# Patient Record
Sex: MALE | Race: WHITE | NOT HISPANIC OR LATINO | URBAN - METROPOLITAN AREA
[De-identification: names, ages, dates, MRNs, and addresses within clinical notes are randomized per-mention and may not be internally consistent; named-entity substitution may affect disease eponyms.]

---

## 2019-09-14 ENCOUNTER — INPATIENT HOSPITAL (OUTPATIENT)
Dept: URBAN - METROPOLITAN AREA HOSPITAL 107 | Facility: HOSPITAL | Age: 34
End: 2019-09-14

## 2019-09-14 DIAGNOSIS — R19.7 DIARRHEA, UNSPECIFIED: ICD-10-CM

## 2019-09-14 DIAGNOSIS — R10.9 UNSPECIFIED ABDOMINAL PAIN: ICD-10-CM

## 2019-09-14 DIAGNOSIS — E86.0 DEHYDRATION: ICD-10-CM

## 2019-09-14 DIAGNOSIS — R11.0 NAUSEA: ICD-10-CM

## 2019-09-14 DIAGNOSIS — R74.8 ABNORMAL LEVELS OF OTHER SERUM ENZYMES: ICD-10-CM

## 2019-09-14 DIAGNOSIS — R93.2 ABNORMAL FINDINGS ON DIAGNOSTIC IMAGING OF LIVER AND BILIARY: ICD-10-CM

## 2019-09-14 DIAGNOSIS — K76.0 FATTY (CHANGE OF) LIVER, NOT ELSEWHERE CLASSIFIED: ICD-10-CM

## 2019-09-14 PROCEDURE — 99254 IP/OBS CNSLTJ NEW/EST MOD 60: CPT | Performed by: INTERNAL MEDICINE

## 2019-09-15 PROCEDURE — 99232 SBSQ HOSP IP/OBS MODERATE 35: CPT | Performed by: INTERNAL MEDICINE

## 2019-09-16 ENCOUNTER — INPATIENT HOSPITAL (OUTPATIENT)
Dept: URBAN - METROPOLITAN AREA HOSPITAL 107 | Facility: HOSPITAL | Age: 34
End: 2019-09-16

## 2019-09-16 DIAGNOSIS — R19.7 DIARRHEA, UNSPECIFIED: ICD-10-CM

## 2019-09-16 DIAGNOSIS — R10.9 UNSPECIFIED ABDOMINAL PAIN: ICD-10-CM

## 2019-09-16 DIAGNOSIS — K76.0 FATTY (CHANGE OF) LIVER, NOT ELSEWHERE CLASSIFIED: ICD-10-CM

## 2019-09-16 DIAGNOSIS — R11.0 NAUSEA: ICD-10-CM

## 2019-09-16 PROCEDURE — 99231 SBSQ HOSP IP/OBS SF/LOW 25: CPT | Performed by: PHYSICIAN ASSISTANT

## 2020-02-20 ENCOUNTER — TRANSCRIBE ORDERS (OUTPATIENT)
Dept: ADMINISTRATIVE | Facility: HOSPITAL | Age: 35
End: 2020-02-20

## 2020-02-20 DIAGNOSIS — R10.11 ABDOMINAL PAIN, RUQ (RIGHT UPPER QUADRANT): Primary | ICD-10-CM

## 2020-03-11 ENCOUNTER — OFFICE VISIT (OUTPATIENT)
Dept: SURGERY | Facility: CLINIC | Age: 35
End: 2020-03-11

## 2020-03-11 VITALS
BODY MASS INDEX: 38.04 KG/M2 | WEIGHT: 251 LBS | HEART RATE: 80 BPM | HEIGHT: 68 IN | SYSTOLIC BLOOD PRESSURE: 120 MMHG | RESPIRATION RATE: 16 BRPM | DIASTOLIC BLOOD PRESSURE: 80 MMHG

## 2020-03-11 DIAGNOSIS — R11.0 NAUSEA: Primary | ICD-10-CM

## 2020-03-11 DIAGNOSIS — I25.2 H/O ACUTE MYOCARDIAL INFARCTION: Primary | ICD-10-CM

## 2020-03-11 DIAGNOSIS — R19.7 DIARRHEA, UNSPECIFIED TYPE: ICD-10-CM

## 2020-03-11 DIAGNOSIS — R10.13 DYSPEPSIA: ICD-10-CM

## 2020-03-11 DIAGNOSIS — R14.0 BLOATING: Primary | ICD-10-CM

## 2020-03-11 DIAGNOSIS — R14.0 BLOATING: ICD-10-CM

## 2020-03-11 PROCEDURE — 99203 OFFICE O/P NEW LOW 30 MIN: CPT | Performed by: SURGERY

## 2020-03-11 RX ORDER — ASPIRIN 81 MG/1
81 TABLET, CHEWABLE ORAL DAILY
COMMUNITY

## 2020-03-11 RX ORDER — AMLODIPINE BESYLATE 5 MG/1
5 TABLET ORAL DAILY
COMMUNITY

## 2020-03-11 RX ORDER — CARVEDILOL 12.5 MG/1
12.5 TABLET ORAL 2 TIMES DAILY WITH MEALS
COMMUNITY

## 2020-03-11 RX ORDER — PANTOPRAZOLE SODIUM 40 MG/1
40 TABLET, DELAYED RELEASE ORAL DAILY
COMMUNITY

## 2020-03-11 RX ORDER — NITROGLYCERIN 0.4 MG/1
0.4 TABLET SUBLINGUAL
COMMUNITY

## 2020-03-11 NOTE — PROGRESS NOTES
Rudy Zayas 34 y.o. male presents @ the req of [facility] for eval of heartburn, reflux, and diarrhea X 6 mos.  Pt also c/o bloating and nausea.   Chief Complaint   Patient presents with   • Heartburn   • Diarrhea             HPI     Above-noted and agree.  This very interesting 34-year-old male has a slightly complex history.  He suffered from a heart attack after a GI illness back in September.  He was treated medically and eventually recovered.  Over the past 6 weeks he has been having issues with bloating, diarrhea, heartburn, and nausea.  He has changed his diet and is decreased his carbohydrate intake and is lost about 40 pounds.  He is a diabetic and has been having issues finding a medication that controls his diabetes.  He does not smoke or use tobacco but he is a former tobacco user.  He denies chest pain or shortness of breath.  He did have a colonoscopy and an EGD greater than 8 years ago which showed some sort of infection but he is unsure of what the exact infection was.  He does not have a family history of colon cancer.  Aside from the diabetes he also has issues with hypertension.  He has no other complaints.    Review of Systems   All other systems reviewed and are negative.            Current Outpatient Medications:   •  amLODIPine (NORVASC) 5 MG tablet, Take 5 mg by mouth Daily., Disp: , Rfl:   •  aspirin 81 MG chewable tablet, Chew 81 mg Daily., Disp: , Rfl:   •  carvedilol (COREG) 12.5 MG tablet, Take 12.5 mg by mouth 2 (Two) Times a Day With Meals., Disp: , Rfl:   •  nitroglycerin (NITROSTAT) 0.4 MG SL tablet, Place 0.4 mg under the tongue Every 5 (Five) Minutes As Needed for Chest Pain. Take no more than 3 doses in 15 minutes., Disp: , Rfl:   •  pantoprazole (PROTONIX) 40 MG EC tablet, Take 40 mg by mouth Daily., Disp: , Rfl:   •  Semaglutide,0.25 or 0.5MG/DOS, (OZEMPIC, 0.25 OR 0.5 MG/DOSE,) 2 MG/1.5ML solution pen-injector, Inject  under the skin into the appropriate area as  "directed 1 (One) Time Per Week., Disp: , Rfl:         Allergies   Allergen Reactions   • Ondansetron Unknown - Low Severity   • Serotonin Reuptake Inhibitors (Ssris) Unknown - Low Severity           Past Medical History:   Diagnosis Date   • Diabetes (CMS/HCC)    • Heart attack (CMS/HCC)    • HTN (hypertension)            Past Surgical History:   Procedure Laterality Date   • APPENDECTOMY     • COLONOSCOPY     • ENDOSCOPY             Social History     Tobacco Use   • Smoking status: Former Smoker   • Smokeless tobacco: Former User   Substance Use Topics   • Alcohol use: Not Currently   • Drug use: Not on file             There is no immunization history on file for this patient.        Physical Exam   Constitutional: He is oriented to person, place, and time. He appears well-developed and well-nourished.   HENT:   Head: Normocephalic and atraumatic.   Right Ear: External ear normal.   Left Ear: External ear normal.   Cardiovascular: Normal rate and regular rhythm.   Pulmonary/Chest: Effort normal and breath sounds normal.   Abdominal: Soft. Bowel sounds are normal.   Musculoskeletal: He exhibits no edema or deformity.   Neurological: He is alert and oriented to person, place, and time.   Skin: Skin is warm and dry.   Psychiatric: He has a normal mood and affect. His behavior is normal.   Nursing note and vitals reviewed.      Debilities/Disabilities Identified: None    Emotional Behavior: Appropriate      /80   Pulse 80   Resp 16   Ht 172.7 cm (68\")   Wt 114 kg (251 lb)   BMI 38.16 kg/m²         Rudy was seen today for heartburn and diarrhea.    Diagnoses and all orders for this visit:    Nausea    Bloating    Dyspepsia    Diarrhea, unspecified type    We will get cardiac clearance for Rudy and schedule him for an EGD, colonoscopy, and gallbladder ultrasound.  I also gave him a diet sheet to follow.    I discussed with the patient the benefits and risks of performing endoscopy.  Benefits and risks " were not limited to but including bleeding, infection, perforation, complications of anesthesia, aspiration.  The patient appeared to understand and is willing to proceed.    Thank you for allowing me to participate in the care of this interesting patient.

## 2020-03-18 ENCOUNTER — TELEPHONE (OUTPATIENT)
Dept: SURGERY | Facility: CLINIC | Age: 35
End: 2020-03-18

## 2020-03-18 NOTE — TELEPHONE ENCOUNTER
Per the direction of Dr. Mayers, following state and national recommendations, a phone call was placed to pt to discuss resched EGD/C-SCOPE/GB US.  Pt is well aware of the pandemic and states he would prefer to resched.  Pt instructed to call this office if his s/s change or worsen while he awaits resched.  Pt verbalizes understanding.  SG sched notified.

## 2020-04-07 ENCOUNTER — APPOINTMENT (OUTPATIENT)
Dept: ULTRASOUND IMAGING | Facility: HOSPITAL | Age: 35
End: 2020-04-07

## 2020-05-21 ENCOUNTER — TELEPHONE (OUTPATIENT)
Dept: SURGERY | Facility: CLINIC | Age: 35
End: 2020-05-21

## 2020-05-21 NOTE — TELEPHONE ENCOUNTER
Multi attempts made to reach pt via phone to discuss EGD/C-SCOPE/GB US.  No response rec'd.  Letter to PCP.

## 2020-08-17 ENCOUNTER — OFFICE (OUTPATIENT)
Dept: URBAN - METROPOLITAN AREA CLINIC 75 | Facility: CLINIC | Age: 35
End: 2020-08-17

## 2020-08-17 VITALS — HEIGHT: 68 IN | WEIGHT: 265 LBS

## 2020-08-17 DIAGNOSIS — R19.4 CHANGE IN BOWEL HABIT: ICD-10-CM

## 2020-08-17 DIAGNOSIS — K21.9 GASTRO-ESOPHAGEAL REFLUX DISEASE WITHOUT ESOPHAGITIS: ICD-10-CM

## 2020-08-17 DIAGNOSIS — R68.81 EARLY SATIETY: ICD-10-CM

## 2020-08-17 DIAGNOSIS — R14.0 ABDOMINAL DISTENSION (GASEOUS): ICD-10-CM

## 2020-08-17 DIAGNOSIS — R10.13 EPIGASTRIC PAIN: ICD-10-CM

## 2020-08-17 PROCEDURE — 99214 OFFICE O/P EST MOD 30 MIN: CPT | Performed by: INTERNAL MEDICINE

## 2020-08-17 NOTE — SERVICEHPINOTES
Mr. Connell is seen today as a hospital follow up from Sept. 2019 when he was treated for a nonspecific jejunitis,presumed infectious etiology.  He has had bloating, early satiety and intermittent loose stools since his discharge.  Symptoms occur regardless of what he eats or drinks. he denies blood in the bowels.  He has no weight loss.  Family history is negative for polyps, colitis or colon cancer. He also complains of epigastric pain, especially with drinking milk.  He was discharged from the hospital on Protonix.  However, he stopped that in December when his insurance stopped paying for it.  He really had not noted any increase or worsening of symptoms since stopping the PPI.  He has omitted milk from his diet.  He has also tried several diets such as the keto diet in an attempt to improve his symptoms.  But, those changes did not help.  He states that normally has a bowel movement once every day or 2.  However, since his hospital discharge he has had intermittent loose stools which she cannot associate with any particular foods.  He denies any weight loss, odynophagia, dysphagia, melena, or hematochezia. He also has postprandial satiety.BR

## 2020-08-17 NOTE — SERVICENOTES
Hospital records reviewed. In the hospital CT showed jejunitis.  He also had a slight transaminitis.  Hemoglobin 13.  Hematocrit 38.5.  Magnesium 2.0.  Phosphorus 3.6.